# Patient Record
(demographics unavailable — no encounter records)

---

## 2024-10-31 NOTE — DISCUSSION/SUMMARY
[FreeTextEntry1] : Seven month old male with acute URI. Recommend supportive care including antipyretics, fluids, OTC cough/cold medications if age-appropriate, and nasal saline followed by nasal suction. Return if symptoms worsen or persist.

## 2024-10-31 NOTE — HISTORY OF PRESENT ILLNESS
[EENT/Resp Symptoms] : EENT/RESPIRATORY SYMPTOMS [Nasal congestion] : nasal congestion [Runny nose] : runny nose [___ Day(s)] : [unfilled] day(s) [Intermittent] : intermittent [Clear rhinorrhea] : clear rhinorrhea [At Night] : at night [Nasal saline] : nasal saline [Nasal suctioning] : nasal suctioning [Change in sleep pattern] : change in sleep pattern [Runny Nose] : runny nose [Nasal Congestion] : nasal congestion [Teething] : teething [Cough] : cough [Known exposure to COVID-19] : no known exposure to COVID-19 [Hx of recent COVID-19 infection] : no history of recent COVID-19 infection [Eye Redness] : no eye redness [Eye Discharge] : no eye discharge [Ear Tugging] : no ear tugging [Wheezing] : no wheezing [Decreased Appetite] : no decreased appetite [Posttussive emesis] : no posttussive emesis [Vomiting] : no vomiting [Diarrhea] : no diarrhea [Decreased Urine Output] : no decreased urine output [Rash] : no rash [Loss of taste] : no loss of taste [Loss of smell] : no loss of smell

## 2024-11-17 NOTE — HISTORY OF PRESENT ILLNESS
[de-identified] : Follow-up Vaccinations [FreeTextEntry6] : Eight month old male presents for vaccinations. Would like COVID vaccination and Beyfortus injection. No fevers. Has been teething. Doing well at this.

## 2024-11-17 NOTE — DISCUSSION/SUMMARY
[FreeTextEntry1] : Eight month old male received COVID vaccination and Beyfortus injection. Tolerated well.   Will follow-up for nine month old well check visit.  [] : The components of the vaccine(s) to be administered today are listed in the plan of care. The disease(s) for which the vaccine(s) are intended to prevent and the risks have been discussed with the caretaker.  The risks are also included in the appropriate vaccination information statements which have been provided to the patient's caregiver.  The caregiver has given consent to vaccinate.

## 2024-12-17 NOTE — HISTORY OF PRESENT ILLNESS
[de-identified] : Follow-up Vaccination [FreeTextEntry6] : Nine-month-old male presents for Hep B#3 vaccination. No fevers. He completed antibiotic course for ear infection. He has no ear pain. He has no fever. He has no difficulty with sleep.

## 2024-12-17 NOTE — DISCUSSION/SUMMARY
[] : The components of the vaccine(s) to be administered today are listed in the plan of care. The disease(s) for which the vaccine(s) are intended to prevent and the risks have been discussed with the caretaker.  The risks are also included in the appropriate vaccination information statements which have been provided to the patient's caregiver.  The caregiver has given consent to vaccinate. [FreeTextEntry1] : Nine-month-old male received Hep B#3 vaccination. Tolerated well.   Will follow-up in three months for 12-month-old well check visit.

## 2024-12-23 NOTE — HISTORY OF PRESENT ILLNESS
[EENT/Resp Symptoms] : EENT/RESPIRATORY SYMPTOMS [Fever] : fever [Nasal congestion] : nasal congestion [Runny nose] : runny nose [Ear Pain] : ear pain [___ Day(s)] : [unfilled] day(s) [Intermittent] : intermittent [Playful] : playful [Clear rhinorrhea] : clear rhinorrhea [At Night] : at night [Nasal saline] : nasal saline [Nasal suctioning] : nasal suctioning [Change in sleep pattern] : change in sleep pattern [Ear Tugging] : ear tugging [Runny Nose] : runny nose [Nasal Congestion] : nasal congestion [Teething] : teething [Cough] : cough [Max Temp: ____] : Max temperature: [unfilled] [Stable] : stable [Known exposure to COVID-19] : no known exposure to COVID-19 [Hx of recent COVID-19 infection] : no history of recent COVID-19 infection [Eye Redness] : no eye redness [Eye Discharge] : no eye discharge [Wheezing] : no wheezing [Decreased Appetite] : no decreased appetite [Posttussive emesis] : no posttussive emesis [Vomiting] : no vomiting [Diarrhea] : no diarrhea [Decreased Urine Output] : no decreased urine output [Rash] : no rash [Loss of taste] : no loss of taste [Loss of smell] : no loss of smell [FreeTextEntry9] : Fever - Friday/Saturday

## 2024-12-23 NOTE — DISCUSSION/SUMMARY
[FreeTextEntry1] : Nine-month-old male with acute URI. Recommend supportive care including antipyretics, fluids, OTC cough/cold medications if age-appropriate, and nasal saline followed by nasal suction. Return if symptoms worsen or persist.

## 2025-01-28 NOTE — REASON FOR VISIT
[Initial Consultation] : an initial consultation for [Parents] : parents [Allergy Evaluation/ Skin Testing] : allergy evaluation and or skin testing

## 2025-01-31 NOTE — ASSESSMENT
[Use of independent historian: [ enter independent historian's relationship to patient ] :____] : As the patient was unable to provide a complete and reliable history, I obtained clinical history from the patient's [unfilled] [FreeTextEntry1] : #Perianal rash c/f paradoxical reaction to Desitin vs. HSV, less likely VZV - Sent bacterial/viral/fungal swab in office (did not unroof lesion) - Recommended plain Vaseline (can use Nystatin), discontinue Desitin - For worsening symptoms, to call office or PMD  -mupirocin/nystatin to use tid x 1 wk  #Irritant contact dermatitis of face, and L 1st thumb, likely 2/2 saliva -- improved Component of #Eczema - Orientation provided about nature of condition, treatment expectations, alternatives, risks and benefits - Avoid products with fragrance, wipes, eye makeup. Advised switching to Vanicream brand products - CONTINUE hydrocortisone 2.5% ointment affected areas - Side Effects were reviewed with patient including atrophy, dyspigmentation, telangiectasias, striae. Proper use reviewed including only using to affected area and avoidance of prolonged use. 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 CeraVe cleanser or Aquaphor)   - Use soap only on areas truly needed (underarms,groin,buttocks,fold areas, feet, face, hair)   - Pat off excess water and put moisturizer on immediately (within 3 min.)               Good moisturizing choices include:                        1. Cetaphil cream (not baby Cetaphil)                        2. CeraVe cream                        3. Vanicream cream                        4. Aquaphor ointment                        5. Vaseline ointment                        6. CeraVe ointment   - A moisturizer should always be applied after showering or bathing, but may be applied as many additional times as is necessary.  f/u PRN

## 2025-01-31 NOTE — HISTORY OF PRESENT ILLNESS
[FreeTextEntry1] : RPA: new complaint - perianal rash [de-identified] : YUE is a 10 month old male last seen in this office 10/1 for irritant contact dermatitis (started hydrocortisone w/improvement), presents with new perianal rash beginning five days ago. Initially started as reddened skin and scabbing around the anus, progressed to white bumps in clusters worst last night. Mom has been applying Desitin daily. No fevers or diarrhea, no known contacts with active cold sores, goes to . Mom was recently treated for shingles around [place] time.   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

## 2025-01-31 NOTE — CONSULT LETTER
[Dear  ___] : Dear  [unfilled], [Consult Letter:] : I had the pleasure of evaluating your patient, [unfilled]. [Please see my note below.] : Please see my note below. [Consult Closing:] : Thank you very much for allowing me to participate in the care of this patient.  If you have any questions, please do not hesitate to contact me. [Sincerely,] : Sincerely, [FreeTextEntry3] : Yanci Edwards MD Pediatric Dermatology [facility]

## 2025-01-31 NOTE — PHYSICAL EXAM
[Alert] : alert [Well Nourished] : well nourished [Conjunctiva Non-injected] : conjunctiva non-injected [No Visual Lymphadenopathy] : no visual  lymphadenopathy [No Clubbing] : no clubbing [No Edema] : no edema [No Bromhidrosis] : no bromhidrosis [No Chromhidrosis] : no chromhidrosis [FreeTextEntry3] : - pustular rash clustered in mucosal areas of anus with crusting, no open lesions, no similar rash anywhere else on body  - tan brown well demarcated circular macule on the L abdomen - erythematous well-demarcated patch on the R abdomen - erythematous patch on the forehead extending into the scalp; well demarcated patch on the occiput - xerosis

## 2025-02-05 NOTE — END OF VISIT
[FreeTextEntry3] : I, Dr. Agatha Fraga, personally performed the evaluation and management (E/M) services for this new patient.  That E/M includes conducting the initial examination, assessing all conditions, and establishing the plan of care.  Today, my CHICHO, [employer], was here to observe my evaluation and management services for this patient to be followed going forward.  [Time Spent: ___ minutes] : I have spent [unfilled] minutes of time on the encounter which excludes teaching and separately reported services.

## 2025-02-05 NOTE — HISTORY OF PRESENT ILLNESS
[Asthma] : asthma [Allergic Rhinitis] : allergic rhinitis [Venom Reactions] : venom reactions [Drug Allergies] : drug allergies [de-identified] : This is a 10 month old male with food allergy, atopic dermatitis and food allergy, currently presenting with his parents for an initial evaluation.   - 12/7/2024- He ingested peanut butter (may be about 1 tbsp, smeared on the toast) and within minutes developed erythematous pruritic rash with hives. He was treated with cetirizine with resolution of hives and erythema. Prior to above reaction he has ingested quarter tsp of peanut butter without any issues.  No associated vomiting, diarrhea. Never ingested tree nuts.   - On separate occasions, ingestion of scrambled eggs leads to perioral erythematous rash. The patient is comfortable no associated pruritus, vomiting or shortness of breath. Gets the same reaction with [place] toast.  Has had chicken cutlet without any issue. Has had cupcake once without any issues.   Atopic dermatitis since infancy. Has very sensitive skin. Followed by derm.  Currently atopic dermatitis is well controlled with emollients.  In terms of body cream Nan cream.   Laboratories reviewed: 12/2024 -  Peanut IgE- 1.32; Lauren h1-4.32; Arah2-0.7; Lauren h6-0.26

## 2025-02-05 NOTE — END OF VISIT
[FreeTextEntry3] : I, Dr. Agatha Fraga, personally performed the evaluation and management (E/M) services for this new patient.  That E/M includes conducting the initial examination, assessing all conditions, and establishing the plan of care.  Today, my CHICHO, [employer], was here to observe my evaluation and management services for this patient to be followed going forward.  [Time Spent: ___ minutes] : I have spent [unfilled] minutes of time on the encounter which excludes teaching and separately reported services.

## 2025-02-05 NOTE — CONSULT LETTER
[Dear  ___] : Dear  [unfilled], [Consult Letter:] : I had the pleasure of evaluating your patient, [unfilled]. [Please see my note below.] : Please see my note below. [Consult Closing:] : Thank you very much for allowing me to participate in the care of this patient.  If you have any questions, please do not hesitate to contact me. [Sincerely,] : Sincerely, [FreeTextEntry3] : MD SUPRIYA Ridley, FARSHAD Adult and Pediatric Allergy, Asthma and Clinical Immunology Associate Professor of Medicine and Pediatrics at   [facility] [place], Adult Allergy and Immunology   [facility]   Division of Allergy, Asthma and Immunology   [address] [place] [address], [place] [address]   Phone [phone]  Fax [phone]

## 2025-02-05 NOTE — HISTORY OF PRESENT ILLNESS
[Asthma] : asthma [Allergic Rhinitis] : allergic rhinitis [Venom Reactions] : venom reactions [Drug Allergies] : drug allergies [de-identified] : This is a 10 month old male with food allergy, atopic dermatitis and food allergy, currently presenting with his parents for an initial evaluation.   - 12/7/2024- He ingested peanut butter (may be about 1 tbsp, smeared on the toast) and within minutes developed erythematous pruritic rash with hives. He was treated with cetirizine with resolution of hives and erythema. Prior to above reaction he has ingested quarter tsp of peanut butter without any issues.  No associated vomiting, diarrhea. Never ingested tree nuts.   - On separate occasions, ingestion of scrambled eggs leads to perioral erythematous rash. The patient is comfortable no associated pruritus, vomiting or shortness of breath. Gets the same reaction with [place] toast.  Has had chicken cutlet without any issue. Has had cupcake once without any issues.   Atopic dermatitis since infancy. Has very sensitive skin. Followed by derm.  Currently atopic dermatitis is well controlled with emollients.  In terms of body cream Nan cream.   Laboratories reviewed: 12/2024 -  Peanut IgE- 1.32; Lauren h1-4.32; Arah2-0.7; Lauren h6-0.26

## 2025-02-05 NOTE — REVIEW OF SYSTEMS
[Nl] : Respiratory [Fatigue] : no fatigue [Fever] : no fever [Eye Discharge] : no eye discharge [Eye Redness] : no redness [Puffy Eyelids] : no puffy ~T eyelids [Bloodshot Eyes] : no bloodshot ~T eyes [Rhinorrhea] : no rhinorrhea [Nasal Congestion] : no nasal congestion [Post Nasal Drip] : no post nasal drip [Nausea] : no nausea [Vomiting] : no vomiting [Diarrhea] : no diarrhea [Seizure] : no seizures [Swollen Glands] : no lymphadenopathy [Sleep Disturbances] : ~T no sleep disturbances [de-identified] : see HPI

## 2025-02-05 NOTE — REVIEW OF SYSTEMS
[Nl] : Respiratory [Fatigue] : no fatigue [Fever] : no fever [Eye Discharge] : no eye discharge [Eye Redness] : no redness [Puffy Eyelids] : no puffy ~T eyelids [Bloodshot Eyes] : no bloodshot ~T eyes [Rhinorrhea] : no rhinorrhea [Nasal Congestion] : no nasal congestion [Post Nasal Drip] : no post nasal drip [Nausea] : no nausea [Vomiting] : no vomiting [Diarrhea] : no diarrhea [Seizure] : no seizures [Swollen Glands] : no lymphadenopathy [Sleep Disturbances] : ~T no sleep disturbances [de-identified] : see HPI

## 2025-02-05 NOTE — CONSULT LETTER
[Dear  ___] : Dear  [unfilled], [Consult Letter:] : I had the pleasure of evaluating your patient, [unfilled]. [Please see my note below.] : Please see my note below. [Consult Closing:] : Thank you very much for allowing me to participate in the care of this patient.  If you have any questions, please do not hesitate to contact me. [Sincerely,] : Sincerely, [FreeTextEntry3] : MD SUPRIYA Ridley, FARSHAD Adult and Pediatric Allergy, Asthma and Clinical Immunology Associate Professor of Medicine and Pediatrics at   [facility] Medicine Section [place], Adult Allergy and Immunology   [facility]   Division of Allergy, Asthma and Immunology   [address] [place] [address], [place] [address]   Phone [phone]  Fax [phone]

## 2025-02-05 NOTE — HISTORY OF PRESENT ILLNESS
[Asthma] : asthma [Allergic Rhinitis] : allergic rhinitis [Venom Reactions] : venom reactions [Drug Allergies] : drug allergies [de-identified] : This is a 10 month old male with food allergy, atopic dermatitis and food allergy, currently presenting with his parents for an initial evaluation.   - 12/7/2024- He ingested peanut butter (may be about 1 tbsp, smeared on the toast) and within minutes developed erythematous pruritic rash with hives. He was treated with cetirizine with resolution of hives and erythema. Prior to above reaction he has ingested quarter tsp of peanut butter without any issues.  No associated vomiting, diarrhea. Never ingested tree nuts.   - On separate occasions, ingestion of scrambled eggs leads to perioral erythematous rash. The patient is comfortable no associated pruritus, vomiting or shortness of breath. Gets the same reaction with [place] toast.  Has had chicken cutlet without any issue. Has had cupcake once without any issues.   Atopic dermatitis since infancy. Has very sensitive skin. Followed by derm.  Currently atopic dermatitis is well controlled with emollients.  In terms of body cream Nan cream.   Laboratories reviewed: 12/2024 -  Peanut IgE- 1.32; Lauren h1-4.32; Arah2-0.7; Lauren h6-0.26

## 2025-02-05 NOTE — HISTORY OF PRESENT ILLNESS
[Asthma] : asthma [Allergic Rhinitis] : allergic rhinitis [Venom Reactions] : venom reactions [Drug Allergies] : drug allergies [de-identified] : This is a 10 month old male with food allergy, atopic dermatitis and food allergy, currently presenting with his parents for an initial evaluation.   - 12/7/2024- He ingested peanut butter (may be about 1 tbsp, smeared on the toast) and within minutes developed erythematous pruritic rash with hives. He was treated with cetirizine with resolution of hives and erythema. Prior to above reaction he has ingested quarter tsp of peanut butter without any issues.  No associated vomiting, diarrhea. Never ingested tree nuts.   - On separate occasions, ingestion of scrambled eggs leads to perioral erythematous rash. The patient is comfortable no associated pruritus, vomiting or shortness of breath. Gets the same reaction with [place]ic toast.  Has had chicken cutlet without any issue. Has had cupcake once without any issues.   Atopic dermatitis since infancy. Has very sensitive skin. Followed by derm.  Currently atopic dermatitis is well controlled with emollients.  In terms of body cream Nan cream.   Laboratories reviewed: 12/2024 -  Peanut IgE- 1.32; Lauren h1-4.32; Arah2-0.7; Lauren h6-0.26

## 2025-02-05 NOTE — END OF VISIT
[FreeTextEntry3] : I, Dr. Agatha Fraga, personally performed the evaluation and management (E/M) services for this new patient.  That E/M includes conducting the initial examination, assessing all conditions, and establishing the plan of care.  Today, my CHICHO, [employer], was here to observe my evaluation and management services for this patient to be followed going forward.  [Time Spent: ___ minutes] : I have spent [unfilled] minutes of time on the encounter which excludes teaching and separately reported services.

## 2025-02-05 NOTE — END OF VISIT
[FreeTextEntry3] : I, Dr. Agatha Fraga, personally performed the evaluation and management (E/M) services for this new patient.  That E/M includes conducting the initial examination, assessing all conditions, and establishing the plan of care.  Today, my CHICHO, [employer], was here to observe my evaluation and management services for this patient to be followed going forward.  [Time Spent: ___ minutes] : I have spent [unfilled] minutes of time on the encounter which excludes teaching and separately reported services.

## 2025-02-05 NOTE — HISTORY OF PRESENT ILLNESS
[Asthma] : asthma [Allergic Rhinitis] : allergic rhinitis [Venom Reactions] : venom reactions [Drug Allergies] : drug allergies [de-identified] : This is a 10 month old male with food allergy, atopic dermatitis and food allergy, currently presenting with his parents for an initial evaluation.   - 12/7/2024- He ingested peanut butter (may be about 1 tbsp, smeared on the toast) and within minutes developed erythematous pruritic rash with hives. He was treated with cetirizine with resolution of hives and erythema. Prior to above reaction he has ingested quarter tsp of peanut butter without any issues.  No associated vomiting, diarrhea. Never ingested tree nuts.   - On separate occasions, ingestion of scrambled eggs leads to perioral erythematous rash. The patient is comfortable no associated pruritus, vomiting or shortness of breath. Gets the same reaction with [place] toast.  Has had chicken cutlet without any issue. Has had cupcake once without any issues.   Atopic dermatitis since infancy. Has very sensitive skin. Followed by derm.  Currently atopic dermatitis is well controlled with emollients.  In terms of body cream Nan cream.   Laboratories reviewed: 12/2024 -  Peanut IgE- 1.32; Lauren h1-4.32; Arah2-0.7; Lauren h6-0.26

## 2025-02-05 NOTE — REVIEW OF SYSTEMS
[Nl] : Respiratory [Fatigue] : no fatigue [Fever] : no fever [Eye Discharge] : no eye discharge [Eye Redness] : no redness [Puffy Eyelids] : no puffy ~T eyelids [Bloodshot Eyes] : no bloodshot ~T eyes [Rhinorrhea] : no rhinorrhea [Nasal Congestion] : no nasal congestion [Post Nasal Drip] : no post nasal drip [Nausea] : no nausea [Vomiting] : no vomiting [Diarrhea] : no diarrhea [Seizure] : no seizures [Swollen Glands] : no lymphadenopathy [Sleep Disturbances] : ~T no sleep disturbances [de-identified] : see HPI

## 2025-02-05 NOTE — CONSULT LETTER
[Dear  ___] : Dear  [unfilled], [Consult Letter:] : I had the pleasure of evaluating your patient, [unfilled]. [Please see my note below.] : Please see my note below. [Consult Closing:] : Thank you very much for allowing me to participate in the care of this patient.  If you have any questions, please do not hesitate to contact me. [Sincerely,] : Sincerely, [FreeTextEntry3] : MD SUPRIYA Ridley, FARSHAD Adult and Pediatric Allergy, Asthma and Clinical Immunology Associate Professor of Medicine and Pediatrics at   [facility] of [facility] Section [place], Adult Allergy and Immunology   [facility]   Division of Allergy, Asthma and Immunology   [address] [place] [address], [place] [address]   Phone [phone]  Fax [phone]

## 2025-02-05 NOTE — REVIEW OF SYSTEMS
[Nl] : Respiratory [Fatigue] : no fatigue [Fever] : no fever [Eye Discharge] : no eye discharge [Eye Redness] : no redness [Puffy Eyelids] : no puffy ~T eyelids [Bloodshot Eyes] : no bloodshot ~T eyes [Rhinorrhea] : no rhinorrhea [Nasal Congestion] : no nasal congestion [Post Nasal Drip] : no post nasal drip [Nausea] : no nausea [Vomiting] : no vomiting [Diarrhea] : no diarrhea [Seizure] : no seizures [Swollen Glands] : no lymphadenopathy [Sleep Disturbances] : ~T no sleep disturbances [de-identified] : see HPI

## 2025-02-05 NOTE — END OF VISIT
[FreeTextEntry3] : I, Dr. Agatha Fraga, personally performed the evaluation and management (E/M) services for this new patient.  That E/M includes conducting the initial examination, assessing all conditions, and establishing the plan of care.  Today, my CHICHO, [employer], was here to observe my evaluation and management services for this patient to be followed going forward.  [Time Spent: ___ minutes] : I have spent [unfilled] minutes of time on the encounter which excludes teaching and separately reported services.

## 2025-02-05 NOTE — CONSULT LETTER
[Dear  ___] : Dear  [unfilled], [Consult Letter:] : I had the pleasure of evaluating your patient, [unfilled]. [Please see my note below.] : Please see my note below. [Consult Closing:] : Thank you very much for allowing me to participate in the care of this patient.  If you have any questions, please do not hesitate to contact me. [Sincerely,] : Sincerely, [FreeTextEntry3] : MD SUPRIYA Ridley, FARSHAD Adult and Pediatric Allergy, Asthma and Clinical Immunology Associate Professor of Medicine and Pediatrics at   [facility] of [facility] Section [place], Adult Allergy and Immunology   [facility]   Division of Allergy, Asthma and Immunology   [address] [place] [address], [place] [address]   Phone [phone]  Fax [phone]

## 2025-02-05 NOTE — CONSULT LETTER
[Dear  ___] : Dear  [unfilled], [Consult Letter:] : I had the pleasure of evaluating your patient, [unfilled]. [Please see my note below.] : Please see my note below. [Consult Closing:] : Thank you very much for allowing me to participate in the care of this patient.  If you have any questions, please do not hesitate to contact me. [Sincerely,] : Sincerely, [FreeTextEntry3] : MD SUPRIYA Ridley, FARSHAD Adult and Pediatric Allergy, Asthma and Clinical Immunology Associate Professor of Medicine and Pediatrics at   [facility] of Medicine Section [place], Adult Allergy and Immunology   [facility]   Division of Allergy, Asthma and Immunology   [address] [place] [address], [place] [address]   Phone [phone]  Fax [phone]

## 2025-02-05 NOTE — PHYSICAL EXAM
[Alert] : alert [Well Nourished] : well nourished [No Acute Distress] : no acute distress [Well Developed] : well developed [Sclera Not Icteric] : sclera not icteric [Normal TMs] : both tympanic membranes were normal [Normal Rate and Effort] : normal respiratory rhythm and effort [No Crackles] : no crackles [Bilateral Audible Breath Sounds] : bilateral audible breath sounds [Normal Rate] : heart rate was normal  [Normal S1, S2] : normal S1 and S2 [No murmur] : no murmur [Regular Rhythm] : with a regular rhythm [Not Distended] : not distended [Normal Cervical Lymph Nodes] : cervical [Skin Intact] : skin intact  [No Rash] : no rash [No Cyanosis] : no cyanosis [Normal Mood] : mood was normal [Conjunctival Erythema] : no conjunctival erythema [Boggy Nasal Turbinates] : no boggy and/or pale nasal turbinates [Posterior Pharyngeal Cobblestoning] : no posterior pharyngeal cobblestoning [Clear Rhinorrhea] : no clear rhinorrhea was seen [Exudate] : no exudate [Wheezing] : no wheezing was heard [Patches] : no patches [Urticaria] : no urticaria

## 2025-02-05 NOTE — SOCIAL HISTORY
[Apartment] : [unfilled] lives in an apartment  [Radiator/Baseboard] : heating provided by radiator(s)/baseboard(s) [None] : none [Dust Mite Covers] : does not have dust mite covers [Bedroom] : not in the bedroom

## 2025-02-05 NOTE — REVIEW OF SYSTEMS
[Nl] : Respiratory [Fatigue] : no fatigue [Fever] : no fever [Eye Discharge] : no eye discharge [Eye Redness] : no redness [Puffy Eyelids] : no puffy ~T eyelids [Bloodshot Eyes] : no bloodshot ~T eyes [Rhinorrhea] : no rhinorrhea [Nasal Congestion] : no nasal congestion [Post Nasal Drip] : no post nasal drip [Nausea] : no nausea [Vomiting] : no vomiting [Diarrhea] : no diarrhea [Seizure] : no seizures [Swollen Glands] : no lymphadenopathy [Sleep Disturbances] : ~T no sleep disturbances [de-identified] : see HPI

## 2025-04-09 NOTE — HISTORY OF PRESENT ILLNESS
[Consent obtained and signed form scanned in to chart] : Consent obtained and signed form scanned in to chart [] : The following medications are to be available during the challenge procedure: [Diphenhydramine] : Diphenhydramine, 1-2mg/kg IM (max dose 50mg), (50mg/1 cc) [___ mg] : Dose: [unfilled] mg [___ cc] : Volume: [unfilled] cc [Epinephrine 1:1000 IM] : Epinephrine 1:1000 IM, 0.01cc/kg (max dose 0.5 cc) [Albuterol MDI] : Albuterol MDI, 2 - 4 puffs [Albuterol nebulized] : Albuterol nebulized, 0.083% [_______] : Time: [unfilled] [Clear] : Skin Findings: Clear [No] : Reaction: No 160.02 [0 Pruritus: 0  - absent] : Pruritus (IB): 0 - absent [0 Urticaria/Angioedema: 0 - Absent] : Urticaria/Angioedema (IC): 0  - Absent [0 Rash: 0 - Absent] : Rash (ID): 0 - Absent [___] : Amount: [unfilled] [FreeTextEntry1] : PB2 Peanut Powder [FreeTextEntry2] : 0.65grams [de-identified] : Sounds clear at baseline. BP: 85/44; HR: 114 [FreeTextEntry9] : BP: 107/62 [de-identified] : BP: 101/63; HR: 93; SPO2: 98% [de-identified] : Unable to do vitals. Sounds clear at baseline.  [de-identified] : Sounds clear  [de-identified] : Sounds clear [de-identified] : BP: 99/49

## 2025-04-09 NOTE — HISTORY OF PRESENT ILLNESS
[Consent obtained and signed form scanned in to chart] : Consent obtained and signed form scanned in to chart [] : The following medications are to be available during the challenge procedure: [Diphenhydramine] : Diphenhydramine, 1-2mg/kg IM (max dose 50mg), (50mg/1 cc) [___ mg] : Dose: [unfilled] mg [___ cc] : Volume: [unfilled] cc [Epinephrine 1:1000 IM] : Epinephrine 1:1000 IM, 0.01cc/kg (max dose 0.5 cc) [Albuterol MDI] : Albuterol MDI, 2 - 4 puffs [Albuterol nebulized] : Albuterol nebulized, 0.083% [_______] : Time: [unfilled] [Clear] : Skin Findings: Clear [No] : Reaction: No [0 Pruritus: 0  - absent] : Pruritus (IB): 0 - absent [0 Urticaria/Angioedema: 0 - Absent] : Urticaria/Angioedema (IC): 0  - Absent [0 Rash: 0 - Absent] : Rash (ID): 0 - Absent [___] : Amount: [unfilled] [FreeTextEntry1] : PB2 Peanut Powder [FreeTextEntry2] : 0.65grams [de-identified] : Sounds clear at baseline. BP: 85/44; HR: 114 [FreeTextEntry9] : BP: 107/62 [de-identified] : BP: 101/63; HR: 93; SPO2: 98% [de-identified] : Unable to do vitals. Sounds clear at baseline.  [de-identified] : Sounds clear  [de-identified] : Sounds clear [de-identified] : BP: 99/49

## 2025-04-09 NOTE — HISTORY OF PRESENT ILLNESS
[Consent obtained and signed form scanned in to chart] : Consent obtained and signed form scanned in to chart [] : The following medications are to be available during the challenge procedure: [Diphenhydramine] : Diphenhydramine, 1-2mg/kg IM (max dose 50mg), (50mg/1 cc) [___ mg] : Dose: [unfilled] mg [___ cc] : Volume: [unfilled] cc [Epinephrine 1:1000 IM] : Epinephrine 1:1000 IM, 0.01cc/kg (max dose 0.5 cc) [Albuterol MDI] : Albuterol MDI, 2 - 4 puffs [Albuterol nebulized] : Albuterol nebulized, 0.083% [_______] : Time: [unfilled] [Clear] : Skin Findings: Clear [No] : Reaction: No [0 Pruritus: 0  - absent] : Pruritus (IB): 0 - absent [0 Urticaria/Angioedema: 0 - Absent] : Urticaria/Angioedema (IC): 0  - Absent [0 Rash: 0 - Absent] : Rash (ID): 0 - Absent [___] : Amount: [unfilled] [FreeTextEntry1] : PB2 Peanut Powder [FreeTextEntry2] : 0.65grams [de-identified] : Sounds clear at baseline. BP: 85/44; HR: 114 [FreeTextEntry9] : BP: 107/62 [de-identified] : BP: 101/63; HR: 93; SPO2: 98% [de-identified] : Unable to do vitals. Sounds clear at baseline.  [de-identified] : Sounds clear  [de-identified] : Sounds clear [de-identified] : BP: 99/49

## 2025-04-20 NOTE — HISTORY OF PRESENT ILLNESS
[de-identified] : 13 months old with peanut allergy, on peanut OIT, returns after mild reactions at home.   4/7- tolerated 0.65grams of peanut powder ( 300mg)  4/8 He tolerated  0.65grams, of peanut powder.  4/10-  4:30 pm received peanut powder and 10 minutes later he developed pruritic hives on the face and swollen eyes. He was treated zyrtec 2ml and symptoms subsided.  4/11- cut the dose 0.32 gram half power ( 150 mg) - tolerated Friday and sat 4/13- Sunday he received 0.32gram of p 12:18pm, fifteen minutes later developed pruritic rash on his face. Zytec 2 ml- rash.  4/14- 5:05 pm 0.32 gram- no reaction.   February he had viral infection- will has  wet cough at night for the past 3 weeks, No fever

## 2025-04-20 NOTE — PHYSICAL EXAM
[Alert] : alert [Well Nourished] : well nourished [Healthy Appearance] : healthy appearance [No Acute Distress] : no acute distress [No Thrush] : no thrush [Normal Rate and Effort] : normal respiratory rhythm and effort [No Crackles] : no crackles [No Retractions] : no retractions [Bilateral Audible Breath Sounds] : bilateral audible breath sounds [Normal Rate] : heart rate was normal  [Regular Rhythm] : with a regular rhythm [Patches] : ~M patches present [Wheezing] : no wheezing was heard

## 2025-04-20 NOTE — HISTORY OF PRESENT ILLNESS
[de-identified] : 13 months old with peanut allergy, on peanut OIT, returns after mild reactions at home.   4/7- tolerated 0.65grams of peanut powder ( 300mg)  4/8 He tolerated  0.65grams, of peanut powder.  4/10-  4:30 pm received peanut powder and 10 minutes later he developed pruritic hives on the face and swollen eyes. He was treated zyrtec 2ml and symptoms subsided.  4/11- cut the dose 0.32 gram half power ( 150 mg) - tolerated Friday and sat 4/13- Sunday he received 0.32gram of p 12:18pm, fifteen minutes later developed pruritic rash on his face. Zytec 2 ml- rash.  4/14- 5:05 pm 0.32 gram- no reaction.   February he had viral infection- will has  wet cough at night for the past 3 weeks, No fever

## 2025-04-20 NOTE — ASSESSMENT
[FreeTextEntry1] :  YUE DEVI  === he has had mild lingering cough since end of march, but there is no wheezing, SOB or any other symptoms  - His lung exam is normal today  - will continue OIT with 1/2 dose YUE tolerated   0.32 gram of peanut powder  ( 150 mg of peanut protein)  today. He was observed for 1 hour after the dose. He and parents were instructed to continue this dose daily. Instructions about dosing in other foods such as yogurt, pudding, apple sauce, etc. were given.  Not to be given in hot liquids/beverage.  Food allergy action plan, continued avoidance of PEANUT  other than above as instructed, was recommended again.  Patient has auto-injectable epinephrine and action plan which  parent verbalized.  We again discussed avoidance of co-factors such as exercise, hot showers, sleep deprivation, and fasting. If there are concurrent viral infections, worsening of asthma, or start of a medication such as NSAIDs is required,  we asked that the patient/parent  inform us prior to dosing of the medication, such that dose adjustments can be considered.   ATOPIC DERMATITIS: Gentle skin care was reviewed. Bathing and skin care of eczematous skin discussed with recommendations to bathe in warm water daily followed by immediate application of topical corticosteroids to inflamed areas, then emollients, as well as use of emollients several times per day. As emollients, we commended using unscented creams such as CeraVe, Vanicream and ointments such as Aquaphor or Vaseline. Topical steroids should be applied sparingly and only to inflamed areas.

## 2025-04-20 NOTE — HISTORY OF PRESENT ILLNESS
[de-identified] : 13 months old with peanut allergy, on peanut OIT, returns after mild reactions at home.   4/7- tolerated 0.65grams of peanut powder ( 300mg)  4/8 He tolerated  0.65grams, of peanut powder.  4/10-  4:30 pm received peanut powder and 10 minutes later he developed pruritic hives on the face and swollen eyes. He was treated zyrtec 2ml and symptoms subsided.  4/11- cut the dose 0.32 gram half power ( 150 mg) - tolerated Friday and sat 4/13- Sunday he received 0.32gram of p 12:18pm, fifteen minutes later developed pruritic rash on his face. Zytec 2 ml- rash.  4/14- 5:05 pm 0.32 gram- no reaction.   February he had viral infection- will has  wet cough at night for the past 3 weeks, No fever

## 2025-05-01 NOTE — PLAN
[FreeTextEntry1] :   PEANUT ALLERGY, on Oral Immunotherapy. Doing well. Discussed risks, benefits and alternatives with mother again and consent signed. Patient tolerated up dosing to 250 mg of peanut protein (0.54g PN powder) with no problem. Again, options and parents goal for a final dose discussed with both parents.   Patient was observed for 90 minutes after dosing in the office, with frequent monitoring, with no issues. He was given specific measured doses for daily home dosing to be taken every day around the same time for the next two weeks with the following instructions in mind: Daily dosing instructions including avoidance of exercise, hot showers, 1 hour prior, and 2 hours after, avoidance of NSAIDs, and caution regarding dosing after gaps, illnesses and on an empty stomach were discussed.  Parents verbalized understanding. Instructions about continued avoidance of peanut  ,label reading, action plan, and emergency medications were again discussed.  Patient should continue to have Epinephrine IM Auto injector _0.15_ mg subcutaneously, as needed for severe allergic reactions. Parent/patient verbalized understanding to follow these instructions as well as instructions such as informing us about missed doses, viral infections, etc. prior to resumption of the following dose. Parent(s) will continue to read labels, follow action plan and have emergency medications readily available for use as needed.

## 2025-05-01 NOTE — PHYSICAL EXAM
[Alert] : alert [Well Nourished] : well nourished [Healthy Appearance] : healthy appearance [No Acute Distress] : no acute distress [Sclera Not Icteric] : sclera not icteric [Conjunctival Erythema] : no conjunctival erythema [No Thrush] : no thrush [Pharyngeal erythema] : no pharyngeal erythema [Exudate] : no exudate [Normal Rate and Effort] : normal respiratory rhythm and effort [No Crackles] : no crackles [No Retractions] : no retractions [Bilateral Audible Breath Sounds] : bilateral audible breath sounds [Wheezing] : no wheezing was heard [Normal Rate] : heart rate was normal  [Regular Rhythm] : with a regular rhythm [Patches] : ~M patches present [Xerosis] : xerosis [Urticaria] : no urticaria [No clubbing] : no clubbing [No Cyanosis] : no cyanosis [Normal Mood] : mood was normal [Normal Affect] : affect was normal [Alert, Awake, Oriented as Age-Appropriate] : alert, awake, oriented as age appropriate

## 2025-05-01 NOTE — HISTORY OF PRESENT ILLNESS
[Consent obtained and signed form scanned in to chart] : Consent obtained and signed form scanned in to chart [] : The following medications are to be available during the challenge procedure: [Diphenhydramine] : Diphenhydramine, 1-2mg/kg IM (max dose 50mg), (50mg/1 cc) [___ mg] : Dose: [unfilled] mg [___ cc] : Volume: [unfilled] cc [Epinephrine 1:1000 IM] : Epinephrine 1:1000 IM, 0.01cc/kg (max dose 0.5 cc) [Albuterol MDI] : Albuterol MDI, 2 - 4 puffs [Albuterol nebulized] : Albuterol nebulized, 0.083% [_______] : Time: [unfilled] [Clear] : Skin Findings: Clear [Yes: ___] : Reaction: Yes - [unfilled] [___] : Amount: [unfilled] [0 Pruritus: 0  - absent] : Pruritus (IB): 0 - absent [0 Urticaria/Angioedema: 0 - Absent] : Urticaria/Angioedema (IC): 0  - Absent [0 Rash: 0 - Absent] : Rash (ID): 0 - Absent [0 Sneezing/Itchin - Absent] : Sneezing/Itching (IIA): 0 - Absent [0 Nasal congestion: 0 - Absent] : Nasal congestion (IIB): 0 - Absent [0 Rhinorrhea: 0 - Absent] : Rhinorrhea (IIC): 0 - Absent [0 Laryngeal: 0 - Absent] : Laryngeal (IID): 0 - Absent [0 Wheezin - Absent] : Wheezing (IIIA): 0 - Absent [0 Gastro-Subjective complaints: 0 - Absent] : Gastro-Subjective Complaints (LISA): 0 - Absent [0 Gastro-Objective complaints: 0 - Absent] : Gastro-Objective Complaints (IVB): 0 - Absent [de-identified] : Increased protein dose to 250. New dose is 0.54g.  0.65g = 300 [FreeTextEntry1] : Peanut [FreeTextEntry2] : 0.54 [FreeTextEntry3] : Rash went away after 20 mins [de-identified] : Clear at baseline. No rash. HR: 108; SPO2: 98% [FreeTextEntry9] : Unable to do vitals due to excessive movement.  [de-identified] : Rash on face. Dr. Fraga aware. [de-identified] : Rash disappeared. No concerns.  [de-identified] : HR: 106; SPO2: 99%. Unable to get BP due to excessive patients.

## 2025-05-01 NOTE — HISTORY OF PRESENT ILLNESS
[Consent obtained and signed form scanned in to chart] : Consent obtained and signed form scanned in to chart [] : The following medications are to be available during the challenge procedure: [Diphenhydramine] : Diphenhydramine, 1-2mg/kg IM (max dose 50mg), (50mg/1 cc) [___ mg] : Dose: [unfilled] mg [___ cc] : Volume: [unfilled] cc [Epinephrine 1:1000 IM] : Epinephrine 1:1000 IM, 0.01cc/kg (max dose 0.5 cc) [Albuterol MDI] : Albuterol MDI, 2 - 4 puffs [Albuterol nebulized] : Albuterol nebulized, 0.083% [_______] : Time: [unfilled] [Clear] : Skin Findings: Clear [Yes: ___] : Reaction: Yes - [unfilled] [___] : Amount: [unfilled] [0 Pruritus: 0  - absent] : Pruritus (IB): 0 - absent [0 Urticaria/Angioedema: 0 - Absent] : Urticaria/Angioedema (IC): 0  - Absent [0 Rash: 0 - Absent] : Rash (ID): 0 - Absent [0 Sneezing/Itchin - Absent] : Sneezing/Itching (IIA): 0 - Absent [0 Nasal congestion: 0 - Absent] : Nasal congestion (IIB): 0 - Absent [0 Rhinorrhea: 0 - Absent] : Rhinorrhea (IIC): 0 - Absent [0 Laryngeal: 0 - Absent] : Laryngeal (IID): 0 - Absent [0 Wheezin - Absent] : Wheezing (IIIA): 0 - Absent [0 Gastro-Subjective complaints: 0 - Absent] : Gastro-Subjective Complaints (LISA): 0 - Absent [0 Gastro-Objective complaints: 0 - Absent] : Gastro-Objective Complaints (IVB): 0 - Absent [de-identified] : Increased protein dose to 250. New dose is 0.54g.  0.65g = 300 [FreeTextEntry1] : Peanut [FreeTextEntry2] : 0.54 [FreeTextEntry3] : Rash went away after 20 mins [de-identified] : Clear at baseline. No rash. HR: 108; SPO2: 98% [FreeTextEntry9] : Unable to do vitals due to excessive movement.  [de-identified] : Rash on face. Dr. Fraga aware. [de-identified] : Rash disappeared. No concerns.  [de-identified] : HR: 106; SPO2: 99%. Unable to get BP due to excessive patients.

## 2025-05-13 NOTE — PHYSICAL EXAM
[Alert] : alert [Well Nourished] : well nourished [Healthy Appearance] : healthy appearance [No Acute Distress] : no acute distress [Sclera Not Icteric] : sclera not icteric [Pharyngeal erythema] : no pharyngeal erythema [Exudate] : no exudate [Normal Rate and Effort] : normal respiratory rhythm and effort [No Crackles] : no crackles [No Retractions] : no retractions [Bilateral Audible Breath Sounds] : bilateral audible breath sounds [Wheezing] : no wheezing was heard [Normal Rate] : heart rate was normal  [Regular Rhythm] : with a regular rhythm [No Rash] : no rash [Urticaria] : no urticaria [Normal Mood] : mood was normal [Normal Affect] : affect was normal [Alert, Awake, Oriented as Age-Appropriate] : alert, awake, oriented as age appropriate

## 2025-05-13 NOTE — PLAN
[FreeTextEntry1] :    Peanut ALLERGY, on Oral Immunotherapy. Doing well. Discussed risks, benefits and alternatives with mother again and consent signed. Patient tolerated up dosing to 500 mg of peanut protein  with no problem.  Patient was observed for 90 minutes after dosing in the office, with frequent monitoring, with no issues. He was given specific measured doses for daily home dosing to be taken every day around the same time for the next two weeks with the following instructions in mind: Daily dosing instructions including avoidance of exercise, hot showers, 1 hour prior, and 2 hours after, avoidance of NSAIDs, and caution regarding dosing after gaps, illnesses and on an empty stomach were discussed.  Parents verbalized understanding. Instructions about continued avoidance of peanut  ,label reading, action plan, and emergency medications were again discussed.  Patient should continue to have Epinephrine IM Auto injector 0.15 mg subcutaneously, as needed for severe allergic reactions. Parent/patient verbalized understanding to follow these instructions as well as instructions such as informing us about missed doses, viral infections, etc. prior to resumption of the following dose. Parent(s) will continue to read labels, follow action plan and have emergency medications readily available for use as needed.

## 2025-05-13 NOTE — HISTORY OF PRESENT ILLNESS
[Consent obtained and signed form scanned in to chart] : Consent obtained and signed form scanned in to chart [] : The following medications are to be available during the challenge procedure: [Diphenhydramine] : Diphenhydramine, 1-2mg/kg IM (max dose 50mg), (50mg/1 cc) [___ mg] : Dose: [unfilled] mg [___ cc] : Volume: [unfilled] cc [Epinephrine 1:1000 IM] : Epinephrine 1:1000 IM, 0.01cc/kg (max dose 0.5 cc) [Albuterol MDI] : Albuterol MDI, 2 - 4 puffs [Albuterol nebulized] : Albuterol nebulized, 0.083% [_______] : Time: [unfilled] [Clear] : Skin Findings: Clear [No] : Reaction: No [___] : Amount: [unfilled] [0 Pruritus: 0  - absent] : Pruritus (IB): 0 - absent [0 Urticaria/Angioedema: 0 - Absent] : Urticaria/Angioedema (IC): 0  - Absent [0 Rash: 0 - Absent] : Rash (ID): 0 - Absent [0 Sneezing/Itchin - Absent] : Sneezing/Itching (IIA): 0 - Absent [0 Nasal congestion: 0 - Absent] : Nasal congestion (IIB): 0 - Absent [0 Rhinorrhea: 0 - Absent] : Rhinorrhea (IIC): 0 - Absent [0 Laryngeal: 0 - Absent] : Laryngeal (IID): 0 - Absent [0 Wheezin - Absent] : Wheezing (IIIA): 0 - Absent [0 Gastro-Subjective complaints: 0 - Absent] : Gastro-Subjective Complaints (LISA): 0 - Absent [0 Gastro-Objective complaints: 0 - Absent] : Gastro-Objective Complaints (IVB): 0 - Absent [de-identified] : Sergio has been tolerating 250 mg of PN protein daily. Parents now put it in milk and he is drinking it easily.   PB2Fit Desensitization As per Dr. Fraga, dose was increased to 1.08g.  [FreeTextEntry1] : PB2Fit [FreeTextEntry2] : 1.08g [FreeTextEntry3] : BP: 115/50; HR: 88; SPO2: 99%. Sounds clear at baseline.  [de-identified] : Sounds clear at baseline. BP: 115/50; HR: 88; SPO2: 99% [FreeTextEntry9] : Tolerated well. No concerns.  [de-identified] : Rash round the mouth.  [de-identified] : Rash disappeared.  [de-identified] : Tolerated well. No concerns.  [de-identified] : Unable to get vitals. No concerns.

## 2025-05-13 NOTE — HISTORY OF PRESENT ILLNESS
[Consent obtained and signed form scanned in to chart] : Consent obtained and signed form scanned in to chart [] : The following medications are to be available during the challenge procedure: [Diphenhydramine] : Diphenhydramine, 1-2mg/kg IM (max dose 50mg), (50mg/1 cc) [___ mg] : Dose: [unfilled] mg [___ cc] : Volume: [unfilled] cc [Epinephrine 1:1000 IM] : Epinephrine 1:1000 IM, 0.01cc/kg (max dose 0.5 cc) [Albuterol MDI] : Albuterol MDI, 2 - 4 puffs [Albuterol nebulized] : Albuterol nebulized, 0.083% [_______] : Time: [unfilled] [Clear] : Skin Findings: Clear [No] : Reaction: No [___] : Amount: [unfilled] [0 Pruritus: 0  - absent] : Pruritus (IB): 0 - absent [0 Urticaria/Angioedema: 0 - Absent] : Urticaria/Angioedema (IC): 0  - Absent [0 Rash: 0 - Absent] : Rash (ID): 0 - Absent [0 Sneezing/Itchin - Absent] : Sneezing/Itching (IIA): 0 - Absent [0 Nasal congestion: 0 - Absent] : Nasal congestion (IIB): 0 - Absent [0 Rhinorrhea: 0 - Absent] : Rhinorrhea (IIC): 0 - Absent [0 Laryngeal: 0 - Absent] : Laryngeal (IID): 0 - Absent [0 Wheezin - Absent] : Wheezing (IIIA): 0 - Absent [0 Gastro-Subjective complaints: 0 - Absent] : Gastro-Subjective Complaints (LISA): 0 - Absent [0 Gastro-Objective complaints: 0 - Absent] : Gastro-Objective Complaints (IVB): 0 - Absent [de-identified] : Sergio has been tolerating 250 mg of PN protein daily. Parents now put it in milk and he is drinking it easily.   PB2Fit Desensitization As per Dr. Fraga, dose was increased to 1.08g.  [FreeTextEntry1] : PB2Fit [FreeTextEntry2] : 1.08g [FreeTextEntry3] : BP: 115/50; HR: 88; SPO2: 99%. Sounds clear at baseline.  [de-identified] : Sounds clear at baseline. BP: 115/50; HR: 88; SPO2: 99% [FreeTextEntry9] : Tolerated well. No concerns.  [de-identified] : Rash round the mouth.  [de-identified] : Rash disappeared.  [de-identified] : Tolerated well. No concerns.  [de-identified] : Unable to get vitals. No concerns.

## 2025-05-29 NOTE — PHYSICAL EXAM
[Alert] : alert [Well Nourished] : well nourished [Healthy Appearance] : healthy appearance [No Acute Distress] : no acute distress [No Thrush] : no thrush [Pharyngeal erythema] : no pharyngeal erythema [Exudate] : no exudate [Normal Rate and Effort] : normal respiratory rhythm and effort [No Crackles] : no crackles [No Retractions] : no retractions [Bilateral Audible Breath Sounds] : bilateral audible breath sounds [Wheezing] : no wheezing was heard [Normal Rate] : heart rate was normal  [Regular Rhythm] : with a regular rhythm [No Rash] : no rash [Urticaria] : no urticaria [Normal Mood] : mood was normal [Normal Affect] : affect was normal [Alert, Awake, Oriented as Age-Appropriate] : alert, awake, oriented as age appropriate

## 2025-05-29 NOTE — HISTORY OF PRESENT ILLNESS
[Consent obtained and signed form scanned in to chart] : Consent obtained and signed form scanned in to chart [] : The following medications are to be available during the challenge procedure: [Diphenhydramine] : Diphenhydramine, 1-2mg/kg IM (max dose 50mg), (50mg/1 cc) [___ mg] : Dose: [unfilled] mg [___ cc] : Volume: [unfilled] cc [Epinephrine 1:1000 IM] : Epinephrine 1:1000 IM, 0.01cc/kg (max dose 0.5 cc) [Albuterol MDI] : Albuterol MDI, 2 - 4 puffs [Albuterol nebulized] : Albuterol nebulized, 0.083% [_______] : Time: [unfilled] [___] : Time: [unfilled] [Clear] : Skin Findings: Clear [No] : Reaction: No [FreeTextEntry1] : Peanut Powder [FreeTextEntry2] : 1.62g [FreeTextEntry3] : Sounds clear. BP: 111/50; HR: 122; SPO2: 96% [FreeTextEntry4] : Tolerated well. No concerns.  [FreeTextEntry5] : BP: 92/55, Unable to take HR; and spo2 due to excessive movement and crying.  [FreeTextEntry6] : No reaction. No concerns. Seen by Dr. Fraga. Discharged home stable.  . . . . . . . . . . . . .

## 2025-05-29 NOTE — PLAN
[FreeTextEntry1] :    Peanut ALLERGY, on Oral Immunotherapy. Doing well. Discussed risks, benefits and alternatives with mother again and consent signed. Patient tolerated up dosing to 1.62g of peanut powder (750 mg of peanut protein)  with no problem.  Patient was observed for 90 minutes after dosing in the office, with frequent monitoring, with no issues. He was given specific measured doses for daily home dosing to be taken every day around the same time for the next two weeks with the following instructions in mind: Daily dosing instructions including avoidance of exercise, hot showers, 1 hour prior, and 2 hours after, avoidance of NSAIDs, and caution regarding dosing after gaps, illnesses and on an empty stomach were discussed.  Parents verbalized understanding. Instructions about continued avoidance of peanuts ,label reading, action plan, and emergency medications were again discussed.  Patient should continue to have Epinephrine IM Auto injector 0.15 mg subcutaneously, as needed for severe allergic reactions. Parent/patient verbalized understanding to follow these instructions as well as instructions such as informing us about missed doses, viral infections, etc. prior to resumption of the following dose. Parent(s) will continue to read labels, follow action plan and have emergency medications readily available for use as needed.

## 2025-06-02 NOTE — HISTORY OF PRESENT ILLNESS
[Cow's milk (Ounces per day ___)] : consumes [unfilled] oz of cow's milk per day [Fruit] : fruit [Vegetables] : vegetables [Eggs] : eggs [de-identified] : 2 oz in morning mixed with peanut butter, 9 oz ( 3oz x 3 at ), 5 oz before bedtime, 3 oz overnight. May be concerned with soy allergy.

## 2025-07-02 NOTE — CONSULT LETTER
[FreeTextEntry1] : Dear Dr. ELIUD BARNES,      I had the pleasure of seeing YUE LYRUSH for follow up today.  Below is my note regarding the office visit today.      Thank you so very much for allowing me to participate in YUE's care.  Please don't hesitate to call me should any questions or issues arise.         Sincerely,     Nickolas Ramirez MD, FACS, FSPU    Chief, Pediatric Urology     Professor of Urology and Pediatrics     [employer]         President, [employer] - [place] Section    Past-President, Societies for Pediatric U[employer]

## 2025-07-02 NOTE — CONSULT LETTER
[FreeTextEntry1] : Dear Dr. ELIUD BARNES,      I had the pleasure of seeing YUE LYRUSH for follow up today.  Below is my note regarding the office visit today.      Thank you so very much for allowing me to participate in YUE's care.  Please don't hesitate to call me should any questions or issues arise.         Sincerely,     Nickolas Ramirez MD, FACS, FSPU    Chief, Pediatric Urology     Professor of Urology and Pediatrics     [employer]         President, [employer] U[employer] Association - [place] Section    Past-President, Societies [employer] Pediatric Urology

## 2025-07-02 NOTE — PROCEDURE
[FreeTextEntry1] : Lysis of adhesions performed    Adhesions completely released and Bacitracin applied   No bleeding and well tolerated   Checked again for bleeding before family left   Discharge instructions were provided   Parent confirmed that all questions were answered and understood

## 2025-07-02 NOTE — HISTORY OF PRESENT ILLNESS
[TextBox_4] : YUE presents today for a follow-up visit. 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 Well healed, no follow-up needed as of 11/2024. New photos received on 4/16/25, possible adhesions present. Returns today for reexamination.

## 2025-07-02 NOTE — ASSESSMENT
[FreeTextEntry1] : YUE has adhesions that were able to be lysed easily in the office. Mom was instructed to keep the area lubricated to avert recurrence. All questions were answered.

## 2025-07-09 NOTE — IMPRESSION
Allie called and would like to speak to Dr Fulton or his nurse.  She said that her insurance company is denying Zepbound and stating that Dr Fulton is not approving the prescription.  Writer did let Allie know that the prior auth was sent and we are waiting for the response.   She can be reached at [phone]   [_____] : peanut ([unfilled]) [] : tree nuts

## 2025-07-22 NOTE — PROCEDURE
[FreeTextEntry1] : 1:08 pm . Child here with both parents, Alert and active. Seen and examined by DR Fraga who went over th dosing Plan for Sergio. Today will get 1.3 grams in weight of PB2 protein powder, See challenge sheet above for details tolerated up dosing to 1.3 grams weight in PB2. No adverse reaction, Seen and examined by DR Fraga

## 2025-07-22 NOTE — HISTORY OF PRESENT ILLNESS
[Consent obtained and signed form scanned in to chart] : Consent obtained and signed form scanned in to chart [] : The following medications are to be available during the challenge procedure: [Diphenhydramine] : Diphenhydramine, 1-2mg/kg IM (max dose 50mg), (50mg/1 cc) [___ mg] : Dose: [unfilled] mg [___ cc] : Volume: [unfilled] cc [Solucortef] : Solucortef, 4-8 mg/kg IM (max dose 200 mg), (100mg/2 cc) [Epinephrine 1:1000 IM] : Epinephrine 1:1000 IM, 0.01cc/kg (max dose 0.5 cc) [Albuterol MDI] : Albuterol MDI, 2 - 4 puffs [Albuterol nebulized] : Albuterol nebulized, 0.083% [_______] : Time: [unfilled] [Clear] : Skin Findings: Clear [No] : Reaction: No [___] : Amount: [unfilled] [___% 1) Skin -  A) Erythematous rash - % area involved] : Erythematous Rash (IA): [unfilled] % area involved [0 Pruritus: 0  - absent] : Pruritus (IB): 0 - absent [0 Urticaria/Angioedema: 0 - Absent] : Urticaria/Angioedema (IC): 0  - Absent [0 Rash: 0 - Absent] : Rash (ID): 0 - Absent [0 Sneezing/Itchin - Absent] : Sneezing/Itching (IIA): 0 - Absent [0 Nasal congestion: 0 - Absent] : Nasal congestion (IIB): 0 - Absent [0 Rhinorrhea: 0 - Absent] : Rhinorrhea (IIC): 0 - Absent [0 Laryngeal: 0 - Absent] : Laryngeal (IID): 0 - Absent [0 Wheezin - Absent] : Wheezing (IIIA): 0 - Absent [0 Gastro-Subjective complaints: 0 - Absent] : Gastro-Subjective Complaints (LISA): 0 - Absent [0 Gastro-Objective complaints: 0 - Absent] : Gastro-Objective Complaints (IVB): 0 - Absent [Antihistamine use in past 5 days] : No antihistamine use in past 5 days [Recent Illness] : no recent illness [Fever] : no fever [FreeTextEntry1] : PB2 [FreeTextEntry2] : 1.3gma wt = 602 mg /PN protein [FreeTextEntry3] : lungs clear, consent signed [FreeTextEntry9] : stableconsent signed [de-identified] : stable, no reaction [de-identified] : no reaction [de-identified] : no reaction pulse = 116 resp 22 lungs clear. [de-identified] : no reaction seen by DR Fraga and discharged home stable in both parents care pulse =106 rsp 20 Seen by MABLE Fraga and discharged home in both parents care. Two weeks supply of PB2 portioned out and given to parents

## 2025-07-22 NOTE — HISTORY OF PRESENT ILLNESS
[Consent obtained and signed form scanned in to chart] : Consent obtained and signed form scanned in to chart [] : The following medications are to be available during the challenge procedure: [Diphenhydramine] : Diphenhydramine, 1-2mg/kg IM (max dose 50mg), (50mg/1 cc) [___ mg] : Dose: [unfilled] mg [___ cc] : Volume: [unfilled] cc [Solucortef] : Solucortef, 4-8 mg/kg IM (max dose 200 mg), (100mg/2 cc) [Epinephrine 1:1000 IM] : Epinephrine 1:1000 IM, 0.01cc/kg (max dose 0.5 cc) [Albuterol MDI] : Albuterol MDI, 2 - 4 puffs [Albuterol nebulized] : Albuterol nebulized, 0.083% [_______] : Time: [unfilled] [Clear] : Skin Findings: Clear [No] : Reaction: No [___] : Amount: [unfilled] [___% 1) Skin -  A) Erythematous rash - % area involved] : Erythematous Rash (IA): [unfilled] % area involved [0 Pruritus: 0  - absent] : Pruritus (IB): 0 - absent [0 Urticaria/Angioedema: 0 - Absent] : Urticaria/Angioedema (IC): 0  - Absent [0 Rash: 0 - Absent] : Rash (ID): 0 - Absent [0 Sneezing/Itchin - Absent] : Sneezing/Itching (IIA): 0 - Absent [0 Nasal congestion: 0 - Absent] : Nasal congestion (IIB): 0 - Absent [0 Rhinorrhea: 0 - Absent] : Rhinorrhea (IIC): 0 - Absent [0 Laryngeal: 0 - Absent] : Laryngeal (IID): 0 - Absent [0 Wheezin - Absent] : Wheezing (IIIA): 0 - Absent [0 Gastro-Subjective complaints: 0 - Absent] : Gastro-Subjective Complaints (LISA): 0 - Absent [0 Gastro-Objective complaints: 0 - Absent] : Gastro-Objective Complaints (IVB): 0 - Absent [Antihistamine use in past 5 days] : No antihistamine use in past 5 days [Recent Illness] : no recent illness [Fever] : no fever [FreeTextEntry1] : PB2 [FreeTextEntry2] : 1.3gma wt = 602 mg /PN protein [FreeTextEntry3] : lungs clear, consent signed [FreeTextEntry9] : stableconsent signed [de-identified] : stable, no reaction [de-identified] : no reaction [de-identified] : no reaction pulse = 116 resp 22 lungs clear. [de-identified] : no reaction seen by DR Fraga and discharged home stable in both parents care pulse =106 rsp 20 Seen by MABLE Fraga and discharged home in both parents care. Two weeks supply of PB2 portioned out and given to parents

## 2025-07-22 NOTE — PHYSICAL EXAM
[Alert] : alert [Well Nourished] : well nourished [Healthy Appearance] : healthy appearance [No Acute Distress] : no acute distress [Sclera Not Icteric] : sclera not icteric [Normal Rate and Effort] : normal respiratory rhythm and effort [No Crackles] : no crackles [No Retractions] : no retractions [Bilateral Audible Breath Sounds] : bilateral audible breath sounds [Normal Rate] : heart rate was normal  [Regular Rhythm] : with a regular rhythm [No Rash] : no rash [Normal Mood] : mood was normal [Normal Affect] : affect was normal [Alert, Awake, Oriented as Age-Appropriate] : alert, awake, oriented as age appropriate [Conjunctival Erythema] : no conjunctival erythema [Wheezing] : no wheezing was heard [Urticaria] : no urticaria

## 2025-07-22 NOTE — PLAN
[FreeTextEntry1] : - Since last updosing 5/27/25 he had 2 episodes of hives: 5/27 (the day of updosing- 1.62g (750 mg of PN protein) ) and then 5/30 in close proximity to the bath, but hours after peanut dose. Then he developed URI symptoms.  PN dose was dropped to 1.08 g  of PB2 (500 mg of peanut protein) Sergio was diagnosed with Coxackie and missed a few peanut doses. As per our phone conversation, the dose was cut and escalated at home: - 0.27 g - 0.54 g - 1.08 g  He has been tolerating  1.08 g  of PB2 (500 mg of peanut protein) for over 2 weeks, no issues. Due to past reaction to 1.62g (750 mg of PN protein)  updosed to 1.3g (602mg of peanut protein)  Discussed risks, benefits and alternatives with mother again and consent signed. Patient tolerated up dosing to 1.3g (602mg of peanut protein) with no problem.  Patient was observed for 90 minutes after dosing in the office, with frequent monitoring, with no issues. He was given specific measured doses for daily home dosing to be taken every day around the same time for the next two weeks with the following instructions in mind: Daily dosing instructions including avoidance of exercise, hot showers, 1 hour prior, and 2 hours after, avoidance of NSAIDs, and caution regarding dosing after gaps, illnesses and on an empty stomach were discussed.  Parents verbalized understanding. Instructions about continued avoidance of peanut ,label reading, action plan, and emergency medications were again discussed.  Patient should continue to have Epinephrine IM Auto injector 0.15 mg subcutaneously, as needed for severe allergic reactions. Parent/patient verbalized understanding to follow these instructions as well as instructions such as informing us about missed doses, viral infections, etc. prior to resumption of the following dose. Parent(s) will continue to read labels, follow action plan and have emergency medications readily available for use as needed.  RTC in 2 weeks- if doing well, plan for 1.62g (750 mg of PN protein)